# Patient Record
Sex: FEMALE | Race: WHITE | Employment: PART TIME | URBAN - NONMETROPOLITAN AREA
[De-identification: names, ages, dates, MRNs, and addresses within clinical notes are randomized per-mention and may not be internally consistent; named-entity substitution may affect disease eponyms.]

---

## 2020-01-06 ENCOUNTER — OFFICE VISIT (OUTPATIENT)
Dept: FAMILY MEDICINE CLINIC | Age: 20
End: 2020-01-06
Payer: MEDICAID

## 2020-01-06 VITALS
DIASTOLIC BLOOD PRESSURE: 62 MMHG | OXYGEN SATURATION: 99 % | SYSTOLIC BLOOD PRESSURE: 110 MMHG | TEMPERATURE: 98.4 F | HEIGHT: 65 IN | HEART RATE: 109 BPM | RESPIRATION RATE: 20 BRPM | WEIGHT: 137 LBS | BODY MASS INDEX: 22.82 KG/M2

## 2020-01-06 DIAGNOSIS — L40.9 PSORIASIS: ICD-10-CM

## 2020-01-06 LAB
ALBUMIN SERPL-MCNC: 4.2 G/DL (ref 3.5–5.2)
ALP BLD-CCNC: 42 U/L (ref 35–104)
ALT SERPL-CCNC: 28 U/L (ref 5–33)
ANION GAP SERPL CALCULATED.3IONS-SCNC: 14 MMOL/L (ref 7–19)
AST SERPL-CCNC: 33 U/L (ref 5–32)
BASOPHILS ABSOLUTE: 0 K/UL (ref 0–0.2)
BASOPHILS RELATIVE PERCENT: 0.6 % (ref 0–1)
BILIRUB SERPL-MCNC: 0.5 MG/DL (ref 0.2–1.2)
BUN BLDV-MCNC: 11 MG/DL (ref 6–20)
C-REACTIVE PROTEIN: 1.47 MG/DL (ref 0–0.5)
CALCIUM SERPL-MCNC: 9.2 MG/DL (ref 8.6–10)
CHLORIDE BLD-SCNC: 100 MMOL/L (ref 98–111)
CO2: 22 MMOL/L (ref 22–29)
CREAT SERPL-MCNC: 0.5 MG/DL (ref 0.5–0.9)
EOSINOPHILS ABSOLUTE: 0 K/UL (ref 0–0.6)
EOSINOPHILS RELATIVE PERCENT: 0.6 % (ref 0–5)
GFR NON-AFRICAN AMERICAN: >60
GLUCOSE BLD-MCNC: 91 MG/DL (ref 74–109)
HCT VFR BLD CALC: 42.4 % (ref 37–47)
HEMOGLOBIN: 13.1 G/DL (ref 12–16)
IMMATURE GRANULOCYTES #: 0 K/UL
LYMPHOCYTES ABSOLUTE: 0.7 K/UL (ref 1.1–4.5)
LYMPHOCYTES RELATIVE PERCENT: 12.8 % (ref 20–40)
MCH RBC QN AUTO: 31 PG (ref 27–31)
MCHC RBC AUTO-ENTMCNC: 30.9 G/DL (ref 33–37)
MCV RBC AUTO: 100.5 FL (ref 81–99)
MONOCYTES ABSOLUTE: 1 K/UL (ref 0–0.9)
MONOCYTES RELATIVE PERCENT: 19 % (ref 0–10)
NEUTROPHILS ABSOLUTE: 3.4 K/UL (ref 1.5–7.5)
NEUTROPHILS RELATIVE PERCENT: 66.6 % (ref 50–65)
PDW BLD-RTO: 12.2 % (ref 11.5–14.5)
PLATELET # BLD: 196 K/UL (ref 130–400)
PMV BLD AUTO: 11.2 FL (ref 9.4–12.3)
POTASSIUM SERPL-SCNC: 4.6 MMOL/L (ref 3.5–5)
RBC # BLD: 4.22 M/UL (ref 4.2–5.4)
SODIUM BLD-SCNC: 136 MMOL/L (ref 136–145)
TOTAL PROTEIN: 7.4 G/DL (ref 6.6–8.7)
WBC # BLD: 5.1 K/UL (ref 4.8–10.8)

## 2020-01-06 PROCEDURE — 99203 OFFICE O/P NEW LOW 30 MIN: CPT | Performed by: NURSE PRACTITIONER

## 2020-01-06 ASSESSMENT — PATIENT HEALTH QUESTIONNAIRE - PHQ9
2. FEELING DOWN, DEPRESSED OR HOPELESS: 0
1. LITTLE INTEREST OR PLEASURE IN DOING THINGS: 0
SUM OF ALL RESPONSES TO PHQ QUESTIONS 1-9: 0
SUM OF ALL RESPONSES TO PHQ QUESTIONS 1-9: 0
SUM OF ALL RESPONSES TO PHQ9 QUESTIONS 1 & 2: 0

## 2020-01-06 ASSESSMENT — ENCOUNTER SYMPTOMS
COUGH: 1
COLOR CHANGE: 0
SHORTNESS OF BREATH: 0
SINUS PAIN: 0
NAUSEA: 0
SINUS PRESSURE: 0
VOMITING: 0

## 2020-01-06 NOTE — PROGRESS NOTES
symmetric. Psychiatric:         Attention and Perception: Attention normal.         Mood and Affect: Mood normal.         Speech: Speech normal.         Behavior: Behavior normal. Behavior is cooperative. Thought Content: Thought content normal.         Cognition and Memory: Cognition and memory normal.         Judgment: Judgment normal.        /62 (Site: Left Upper Arm, Position: Sitting, Cuff Size: Medium Adult)   Pulse 109   Temp 98.4 °F (36.9 °C) (Oral)   Resp 20   Ht 5' 4.5\" (1.638 m)   Wt 137 lb (62.1 kg)   SpO2 99%   BMI 23.15 kg/m²      ASSESSMENT:      ICD-10-CM    1. Psoriasis L40.9 External Referral To Dermatology     CBC Auto Differential     Comprehensive Metabolic Panel     EMANUEL Screen with Reflex     Sedimentation Rate     C-Reactive Protein       PLAN:  Consult dermatology. Review labs. Silvia Morales: Established New Doctor (New patient C/O needed a referral to Dermatolgist for Psoriasis )      Diagnosis and orders for this visit are above.

## 2020-01-09 LAB — ANA IGG, ELISA: DETECTED

## 2020-01-22 ENCOUNTER — TELEPHONE (OUTPATIENT)
Dept: FAMILY MEDICINE CLINIC | Age: 20
End: 2020-01-22

## 2020-04-21 ENCOUNTER — OFFICE VISIT (OUTPATIENT)
Dept: PRIMARY CARE CLINIC | Age: 20
End: 2020-04-21
Payer: MEDICAID

## 2020-04-21 VITALS — HEART RATE: 99 BPM | OXYGEN SATURATION: 100 % | TEMPERATURE: 99.2 F

## 2020-04-21 PROCEDURE — 99213 OFFICE O/P EST LOW 20 MIN: CPT | Performed by: NURSE PRACTITIONER

## 2020-04-21 ASSESSMENT — ENCOUNTER SYMPTOMS
DIARRHEA: 0
SINUS PRESSURE: 0
COUGH: 1
VOMITING: 0
RHINORRHEA: 0
CONSTIPATION: 0
NAUSEA: 0
ABDOMINAL PAIN: 0
SORE THROAT: 1
SHORTNESS OF BREATH: 0
TROUBLE SWALLOWING: 0

## 2020-04-21 NOTE — PATIENT INSTRUCTIONS
Patient Education        Bronchitis: Care Instructions  Your Care Instructions    Bronchitis is inflammation of the bronchial tubes, which carry air to the lungs. The tubes swell and produce mucus, or phlegm. The mucus and inflamed bronchial tubes make you cough. You may have trouble breathing. Most cases of bronchitis are caused by viruses like those that cause colds. Antibiotics usually do not help and they may be harmful. Bronchitis usually develops rapidly and lasts about 2 to 3 weeks in otherwise healthy people. Follow-up care is a key part of your treatment and safety. Be sure to make and go to all appointments, and call your doctor if you are having problems. It's also a good idea to know your test results and keep a list of the medicines you take. How can you care for yourself at home? · Take all medicines exactly as prescribed. Call your doctor if you think you are having a problem with your medicine. · Get some extra rest.  · Take an over-the-counter pain medicine, such as acetaminophen (Tylenol), ibuprofen (Advil, Motrin), or naproxen (Aleve) to reduce fever and relieve body aches. Read and follow all instructions on the label. · Do not take two or more pain medicines at the same time unless the doctor told you to. Many pain medicines have acetaminophen, which is Tylenol. Too much acetaminophen (Tylenol) can be harmful. · Take an over-the-counter cough medicine that contains dextromethorphan to help quiet a dry, hacking cough so that you can sleep. Avoid cough medicines that have more than one active ingredient. Read and follow all instructions on the label. · Breathe moist air from a humidifier, hot shower, or sink filled with hot water. The heat and moisture will thin mucus so you can cough it out. · Do not smoke. Smoking can make bronchitis worse. If you need help quitting, talk to your doctor about stop-smoking programs and medicines.  These can increase your chances of quitting for good.  When should you call for help? Call 911 anytime you think you may need emergency care. For example, call if:    · You have severe trouble breathing.    Call your doctor now or seek immediate medical care if:    · You have new or worse trouble breathing.     · You cough up dark brown or bloody mucus (sputum).     · You have a new or higher fever.     · You have a new rash.    Watch closely for changes in your health, and be sure to contact your doctor if:    · You cough more deeply or more often, especially if you notice more mucus or a change in the color of your mucus.     · You are not getting better as expected. Where can you learn more? Go to [email or web address]://[email or web address] [email or web address] and sign in to your Stellaris account. Enter H333 in the HipLink box to learn more about \"Bronchitis: Care Instructions. \"     If you do not have an account, please click on the \"Sign Up Now\" link. Current as of: June 9, 2019Content Version: 12.4  © 7735-7878 Healthwise, Incorporated. Care instructions adapted under license by [facility] [address] If you have questions about a medical condition or this instruction, always ask your healthcare professional. [place] [address] any warranty or liability for your use of this information.

## 2020-04-21 NOTE — PROGRESS NOTES
weight change. HENT: Positive for sore throat. Negative for congestion, hearing loss, rhinorrhea, sinus pressure and trouble swallowing. Eyes: Negative for visual disturbance. Respiratory: Positive for cough. Negative for shortness of breath. Cardiovascular: Negative for chest pain, palpitations and leg swelling. Gastrointestinal: Negative for abdominal pain, constipation, diarrhea, nausea and vomiting. Endocrine: Negative for cold intolerance and heat intolerance. Genitourinary: Negative for flank pain, menstrual problem, pelvic pain, urgency and vaginal discharge. Musculoskeletal: Negative for arthralgias. Skin: Negative for rash. Neurological: Negative for headaches. Psychiatric/Behavioral: Negative for dysphoric mood and sleep disturbance. The patient is not nervous/anxious. Objective:     Physical Exam  Vitals signs reviewed. Constitutional:       Appearance: Normal appearance. HENT:      Head: Normocephalic and atraumatic. Right Ear: Tympanic membrane, ear canal and external ear normal.      Left Ear: Tympanic membrane, ear canal and external ear normal.      Nose: Nose normal.      Mouth/Throat:      Mouth: Mucous membranes are moist.      Pharynx: Oropharynx is clear. Eyes:      Conjunctiva/sclera: Conjunctivae normal.   Neck:      Musculoskeletal: Normal range of motion and neck supple. Cardiovascular:      Rate and Rhythm: Normal rate and regular rhythm. Pulses: Normal pulses. Heart sounds: Normal heart sounds. Pulmonary:      Effort: Pulmonary effort is normal.      Breath sounds: Normal breath sounds. Abdominal:      General: There is no distension. Palpations: Abdomen is soft. Tenderness: There is no abdominal tenderness. There is no guarding. Musculoskeletal: Normal range of motion. Skin:     General: Skin is warm. Neurological:      General: No focal deficit present.       Mental Status: She is alert and oriented to person,

## 2021-02-09 ENCOUNTER — OFFICE VISIT (OUTPATIENT)
Dept: FAMILY MEDICINE CLINIC | Age: 21
End: 2021-02-09
Payer: MEDICAID

## 2021-02-09 VITALS
OXYGEN SATURATION: 98 % | HEART RATE: 90 BPM | WEIGHT: 125 LBS | BODY MASS INDEX: 21.12 KG/M2 | TEMPERATURE: 96.8 F | RESPIRATION RATE: 16 BRPM

## 2021-02-09 DIAGNOSIS — J34.89 POSTERIOR RHINORRHEA: ICD-10-CM

## 2021-02-09 DIAGNOSIS — J20.9 ACUTE BRONCHITIS, UNSPECIFIED ORGANISM: Primary | ICD-10-CM

## 2021-02-09 PROCEDURE — 99213 OFFICE O/P EST LOW 20 MIN: CPT | Performed by: FAMILY MEDICINE

## 2021-02-09 RX ORDER — LORATADINE 10 MG/1
10 TABLET ORAL DAILY
Qty: 30 TABLET | Refills: 1 | Status: SHIPPED | OUTPATIENT
Start: 2021-02-09

## 2021-02-09 RX ORDER — AZITHROMYCIN 250 MG/1
250 TABLET, FILM COATED ORAL SEE ADMIN INSTRUCTIONS
Qty: 6 TABLET | Refills: 0 | Status: SHIPPED | OUTPATIENT
Start: 2021-02-09 | End: 2021-02-14

## 2021-02-09 RX ORDER — FLUTICASONE PROPIONATE 50 MCG
2 SPRAY, SUSPENSION (ML) NASAL DAILY
Qty: 1 BOTTLE | Refills: 1 | Status: SHIPPED | OUTPATIENT
Start: 2021-02-09

## 2021-02-09 NOTE — PROGRESS NOTES
[facility] PHYSICIAN SERVICES  [facility] [place]  [address] [address]  Dept: [phone]  Dept Fax: [phone]: [phone]     Visit type: Established patient    Reason for Visit: Pharyngitis (x 2 days), Cough (occ. states vaps), and Ear Fullness (left )      Assessment and Plan       1. Acute bronchitis, unspecified organism  -     azithromycin (ZITHROMAX) 250 MG tablet; Take 1 tablet by mouth See Admin Instructions for 5 days 500mg on day 1 followed by 250mg on days 2 - 5, Disp-6 tablet, R-0Normal  2. Posterior rhinorrhea  -     loratadine (CLARITIN) 10 MG tablet; Take 1 tablet by mouth daily, Disp-30 tablet, R-1Normal  -     fluticasone (FLONASE) 50 MCG/ACT nasal spray; 2 sprays by Each Nostril route daily, Disp-1 Bottle, R-1Normal    Discussed diagnosis, expected course, and proper use of medication, including OTC medications if prescription is too expensive or insurance does not cover. Discussed signs and symptoms requiring medical attention. All questions were answered and patient voiced understanding and agreement with plan as discussed. Return if symptoms worsen or fail to improve, for next scheduled follow up with PCP. Subjective       HPI   Patient reports that he has been having some issues with a sore throat and some upper body soreness. He states is been ongoing for approximately past 2 days and has taken some pain relievers that helped for a little while but otherwise has not been take anything in efforts to improve his symptoms. He does state that the sore throat and cough and achiness has progressively gotten worse over the past day or so. He has had some left ear fullness associated with his symptoms as well. He denies any specific aggravating or relieving factors for symptoms other than mentioned above. He denies any fever or known sick contacts. Review of Systems   Constitutional: Negative for activity change, appetite change and fever.    HENT: Positive for ear pain and sore throat. Negative for congestion and rhinorrhea. Eyes: Negative for pain and discharge. Respiratory: Positive for cough. Negative for shortness of breath. Cardiovascular: Negative for chest pain and palpitations. Gastrointestinal: Negative for abdominal pain, constipation, diarrhea, nausea and vomiting. Endocrine: Negative for cold intolerance and heat intolerance. Genitourinary: Negative for dysuria and hematuria. Musculoskeletal: Positive for myalgias. Negative for gait problem and neck pain. Skin: Negative for rash and wound. No Known Allergies    Outpatient Medications Prior to Visit   Medication Sig Dispense Refill    UNABLE TO FIND Indications: Birth control given by Health Dept        No facility-administered medications prior to visit. Past Medical History:   Diagnosis Date    Psoriasis         Social History     Tobacco Use    Smoking status: Never Smoker    Smokeless tobacco: Never Used   Substance Use Topics    Alcohol use: No     Alcohol/week: 0.0 standard drinks        No past surgical history on file. No family history on file. Objective       Pulse 90   Temp 96.8 °F (36 °C) (Skin)   Resp 16   Wt 125 lb (56.7 kg)   SpO2 98%   BMI 21.12 kg/m²   Physical Exam  Vitals signs and nursing note reviewed. Constitutional:       Appearance: She is well-developed. HENT:      Head: Normocephalic and atraumatic. Right Ear: Hearing, tympanic membrane, ear canal and external ear normal.      Left Ear: Hearing, tympanic membrane, ear canal and external ear normal.      Nose: Congestion and rhinorrhea present. Mouth/Throat:      Mouth: Mucous membranes are moist.      Pharynx: No oropharyngeal exudate or posterior oropharyngeal erythema. Eyes:      General: No scleral icterus. Right eye: No discharge. Left eye: No discharge.       Conjunctiva/sclera: Conjunctivae normal.      Pupils: Pupils are equal, round, and reactive to light. Neck:      Musculoskeletal: Normal range of motion and neck supple. Trachea: No tracheal deviation. Cardiovascular:      Rate and Rhythm: Normal rate and regular rhythm. Heart sounds: Normal heart sounds. No murmur. No friction rub. No gallop. Pulmonary:      Effort: Pulmonary effort is normal. No respiratory distress. Breath sounds: Normal breath sounds. No wheezing or rales. Comments: Upper airway coarse breath sounds. Abdominal:      General: Bowel sounds are normal. There is no distension. Palpations: Abdomen is soft. Tenderness: There is no abdominal tenderness. Musculoskeletal: Normal range of motion. General: No deformity ( no gross deformities of upper or lower extremities bilaterally). Right lower leg: No edema. Left lower leg: No edema. Lymphadenopathy:      Cervical: No cervical adenopathy. Skin:     General: Skin is warm and dry. Findings: No erythema or rash. Neurological:      Mental Status: She is alert and oriented to person, place, and time. Cranial Nerves: No cranial nerve deficit. Motor: No abnormal muscle tone. Psychiatric:         Behavior: Behavior normal.         Thought Content:  Thought content normal.           Data Reviewed and Summarized       Labs:     Imaging/Testing:        Richardo Carrel, MD

## 2021-02-27 ASSESSMENT — ENCOUNTER SYMPTOMS
VOMITING: 0
EYE PAIN: 0
EYE DISCHARGE: 0
SHORTNESS OF BREATH: 0
ABDOMINAL PAIN: 0
DIARRHEA: 0
RHINORRHEA: 0
NAUSEA: 0
CONSTIPATION: 0
COUGH: 1
SORE THROAT: 1

## 2021-02-27 NOTE — PATIENT INSTRUCTIONS
Patient Education        Bronchitis: Care Instructions  Your Care Instructions     Bronchitis is inflammation of the bronchial tubes, which carry air to the lungs. The tubes swell and produce mucus, or phlegm. The mucus and inflamed bronchial tubes make you cough. You may have trouble breathing. Most cases of bronchitis are caused by viruses like those that cause colds. Antibiotics usually do not help and they may be harmful. Bronchitis usually develops rapidly and lasts about 2 to 3 weeks in otherwise healthy people. Follow-up care is a key part of your treatment and safety. Be sure to make and go to all appointments, and call your doctor if you are having problems. It's also a good idea to know your test results and keep a list of the medicines you take. How can you care for yourself at home? · Take all medicines exactly as prescribed. Call your doctor if you think you are having a problem with your medicine. · Get some extra rest.  · Take an over-the-counter pain medicine, such as acetaminophen (Tylenol), ibuprofen (Advil, Motrin), or naproxen (Aleve) to reduce fever and relieve body aches. Read and follow all instructions on the label. · Do not take two or more pain medicines at the same time unless the doctor told you to. Many pain medicines have acetaminophen, which is Tylenol. Too much acetaminophen (Tylenol) can be harmful. · Take an over-the-counter cough medicine that contains dextromethorphan to help quiet a dry, hacking cough so that you can sleep. Avoid cough medicines that have more than one active ingredient. Read and follow all instructions on the label. · Breathe moist air from a humidifier, hot shower, or sink filled with hot water. The heat and moisture will thin mucus so you can cough it out. · Do not smoke. Smoking can make bronchitis worse. If you need help quitting, talk to your doctor about stop-smoking programs and medicines.  These can increase your chances of quitting for good.  When should you call for help? Call 911 anytime you think you may need emergency care. For example, call if:    · You have severe trouble breathing. Call your doctor now or seek immediate medical care if:    · You have new or worse trouble breathing.     · You cough up dark brown or bloody mucus (sputum).     · You have a new or higher fever.     · You have a new rash. Watch closely for changes in your health, and be sure to contact your doctor if:    · You cough more deeply or more often, especially if you notice more mucus or a change in the color of your mucus.     · You are not getting better as expected. Where can you learn more? Go to [email or web address] [email or web address] and sign in to your Vessix account. Enter H333 in the PixelPlay box to learn more about \"Bronchitis: Care Instructions. \"     If you do not have an account, please click on the \"Sign Up Now\" link. Current as of: February 24, 2020               Content Version: 12.6  © 2006-2020 [employer] Incorporated. Care instructions adapted under license by [facility] ([facility]). If you have questions about a medical condition or this instruction, always ask your healthcare professional. [place] [address] any warranty or liability for your use of this information.

## 2021-10-28 ENCOUNTER — OFFICE VISIT (OUTPATIENT)
Dept: FAMILY MEDICINE CLINIC | Age: 21
End: 2021-10-28
Payer: MEDICAID

## 2021-10-28 VITALS
HEIGHT: 63 IN | WEIGHT: 126 LBS | HEART RATE: 98 BPM | OXYGEN SATURATION: 98 % | TEMPERATURE: 99 F | BODY MASS INDEX: 22.32 KG/M2

## 2021-10-28 DIAGNOSIS — J02.0 ACUTE STREPTOCOCCAL PHARYNGITIS: ICD-10-CM

## 2021-10-28 DIAGNOSIS — J02.9 SORE THROAT: Primary | ICD-10-CM

## 2021-10-28 LAB — S PYO AG THROAT QL: ABNORMAL

## 2021-10-28 PROCEDURE — 99213 OFFICE O/P EST LOW 20 MIN: CPT | Performed by: NURSE PRACTITIONER

## 2021-10-28 PROCEDURE — 87880 STREP A ASSAY W/OPTIC: CPT | Performed by: NURSE PRACTITIONER

## 2021-10-28 RX ORDER — AZITHROMYCIN 250 MG/1
250 TABLET, FILM COATED ORAL SEE ADMIN INSTRUCTIONS
Qty: 6 TABLET | Refills: 0 | Status: SHIPPED | OUTPATIENT
Start: 2021-10-28 | End: 2021-11-02

## 2021-10-28 ASSESSMENT — ENCOUNTER SYMPTOMS
COUGH: 1
NAUSEA: 0
VOMITING: 0
SHORTNESS OF BREATH: 0
SORE THROAT: 1

## 2021-10-28 NOTE — PROGRESS NOTES
SUBJECTIVE:  Sore throat   Patient ID: Megan Santacruz is a 23 y.o. female. HPI:   HPI   A few weeks ago had a sore throat tested positive for Strep. placed on Augmentin developed a rash and was told to stop med and given a steriod dose pk and once finished the Sore throat came back   Past Medical History:   Diagnosis Date    Psoriasis       Prior to Visit Medications    Medication Sig Taking? Authorizing Provider   azithromycin (ZITHROMAX) 250 MG tablet Take 1 tablet by mouth See Admin Instructions for 5 days 500mg on day 1 followed by 250mg on days 2 - 5 Yes Jared Lomas, DENISE   UNABLE TO FIND Indications: Birth control given by Health Dept  Yes Historical Provider, MD   loratadine (CLARITIN) 10 MG tablet Take 1 tablet by mouth daily  Nir Naidu MD   fluticasone (FLONASE) 50 MCG/ACT nasal spray 2 sprays by Each Nostril route daily  Nir Naidu MD     Allergies   Allergen Reactions    Augmentin [Amoxicillin-Pot Clavulanate] Rash       Review of Systems   Constitutional: Negative for fever. HENT: Positive for congestion and sore throat. Respiratory: Positive for cough. Negative for shortness of breath. Gastrointestinal: Negative for nausea and vomiting. Neurological: Negative for headaches. OBJECTIVE:    Physical Exam  Constitutional:       Appearance: She is well-developed. HENT:      Head: Normocephalic and atraumatic. Right Ear: Tympanic membrane, ear canal and external ear normal. No drainage or tenderness. No middle ear effusion. There is no impacted cerumen. Tympanic membrane is not injected or bulging. Left Ear: Tympanic membrane, ear canal and external ear normal. No drainage or tenderness. No middle ear effusion. There is no impacted cerumen. Tympanic membrane is not injected or bulging. Nose: Nose normal. No congestion or rhinorrhea. Right Sinus: No maxillary sinus tenderness or frontal sinus tenderness.       Left Sinus: No maxillary sinus kg/m²      ASSESSMENT:      ICD-10-CM    1. Sore throat  J02.9 POCT rapid strep A   2. Acute streptococcal pharyngitis  J02.0 azithromycin (ZITHROMAX) 250 MG tablet       PLAN:    Silvia Morales: Pharyngitis and Cough  strep negative  Tylenol for fever  Push fluids. RTC for no improvement.

## 2023-09-05 ENCOUNTER — TELEPHONE (OUTPATIENT)
Age: 23
End: 2023-09-05
Payer: MEDICAID

## 2023-09-05 NOTE — TELEPHONE ENCOUNTER
Patient called to see if she had been scheduled for an appt I told her that Karlie will call her referring with appt she was ok with this.

## 2023-09-08 ENCOUNTER — HOSPITAL ENCOUNTER (OUTPATIENT)
Dept: GENERAL RADIOLOGY | Facility: HOSPITAL | Age: 23
Discharge: HOME | End: 2023-09-08
Payer: MEDICAID

## 2023-09-08 ENCOUNTER — OFFICE VISIT (OUTPATIENT)
Age: 23
End: 2023-09-08
Payer: MEDICAID

## 2023-09-08 ENCOUNTER — LAB (OUTPATIENT)
Dept: LAB | Facility: HOSPITAL | Age: 23
End: 2023-09-08
Payer: MEDICAID

## 2023-09-08 ENCOUNTER — TELEPHONE (OUTPATIENT)
Age: 23
End: 2023-09-08
Payer: MEDICAID

## 2023-09-08 VITALS
HEIGHT: 63 IN | OXYGEN SATURATION: 95 % | HEART RATE: 102 BPM | SYSTOLIC BLOOD PRESSURE: 110 MMHG | WEIGHT: 124.2 LBS | BODY MASS INDEX: 22.01 KG/M2 | DIASTOLIC BLOOD PRESSURE: 60 MMHG | TEMPERATURE: 98.8 F

## 2023-09-08 DIAGNOSIS — R61 NIGHT SWEATS: ICD-10-CM

## 2023-09-08 DIAGNOSIS — R76.12 POSITIVE QUANTIFERON-TB GOLD TEST: ICD-10-CM

## 2023-09-08 DIAGNOSIS — R00.2 PALPITATIONS: ICD-10-CM

## 2023-09-08 DIAGNOSIS — L40.9 PSORIASIS: ICD-10-CM

## 2023-09-08 DIAGNOSIS — R61 NIGHT SWEATS: Primary | ICD-10-CM

## 2023-09-08 PROCEDURE — 36415 COLL VENOUS BLD VENIPUNCTURE: CPT

## 2023-09-08 PROCEDURE — 71046 X-RAY EXAM CHEST 2 VIEWS: CPT

## 2023-09-08 PROCEDURE — 84443 ASSAY THYROID STIM HORMONE: CPT

## 2023-09-08 PROCEDURE — 84439 ASSAY OF FREE THYROXINE: CPT

## 2023-09-08 RX ORDER — SECUKINUMAB 150 MG/ML
150 INJECTION SUBCUTANEOUS
COMMUNITY
Start: 2023-07-10

## 2023-09-08 NOTE — TELEPHONE ENCOUNTER
----- Message from Shena Thomas MD sent at 9/8/2023 12:03 PM CDT -----  Please notify patient that chest x-ray is normal.        I called patient to inform her that per Dr Enamorado her chest xray was normal. She asked if I could send those results to her employer. I let her know that I did not have consent. I told her that she could go on [employer] portal print it herself then take it to  them she was ok with this.

## 2023-09-08 NOTE — PATIENT INSTRUCTIONS
Chest x ray today at [facility]  Labs today at [facility]

## 2023-09-08 NOTE — PROGRESS NOTES
[facility] [place]    Patient:  Sharifa Yuan 22 y.o. female  YOB: 2000  MRN: [id number]  Primary Care Physician: Provider, No Known  REFERRING PROVIDER: Stephanie Bruce, *    Chief Complaint  positive Quantiferon Gold  (With no complaints at this time.  She has been on Cosentyx for about 2 years for psoriasis.  She has labs drawn yearly. )        History of Present Illness  Sharifa Yuan presents to [facility] INFECTIOUS DISEASES    Had fever in August but she was not sure as to what it was.  She said it was just noted when she went to the dermatology office.  I looked at that note and she had a temp of 100.  She thought it may have been 1 of those days it was extremely hot outside.  She has not noted any fever since then    She is vaping and trying to cut down.  She has been vaping for about 4 years.  Prior to that she smoked tobacco.    Has had night sweats for the last 6 months ( most nights with wet shirt she gets up changes clothes) turns AC down.  She has not had any changes in the location where she is sleeping, her bedding, etc.    She has noted palpitations.  She never had her thyroid checked    Has hard time maintaining weight ( due to not eating 3 meals a day) she states this has been going on for quite a while.  She did not really notice it for example when she was in high school but more recently she has had 2 make sure she eats.  She is able to maintain her weight if she does that    No PCP  Productive cough ( same from vaping) no hemoptysis.  No known exposures to tuberculosis.  She does work at a [employer]    Past Medical History:   Diagnosis Date    Psoriasis 2007     History reviewed. No pertinent surgical history.  Family History   Problem Relation Age of Onset    Skin cancer Maternal Grandmother      Social History     Socioeconomic History    Marital status: Single   Tobacco Use    Smoking status: Former     Packs/day: 0.50      "Years: 3.00     Pack years: 1.50     Types: Cigarettes     Start date:      Quit date: 2019     Years since quittin.6     Passive exposure: Never    Smokeless tobacco: Never   Vaping Use    Vaping Use: Every day    Substances: Nicotine, Flavoring    Devices: Disposable, Pre-filled pod   Substance and Sexual Activity    Alcohol use: Not Currently    Drug use: Never    Sexual activity: Defer     Current Outpatient Medications on File Prior to Visit   Medication Sig    Cosentyx, 300 MG Dose, 150 MG/ML solution prefilled syringe Inject 1 mL under the skin into the appropriate area as directed Every 30 (Thirty) Days. ON HOLD. LAST DOSE WAS 2023. (Patient not taking: Reported on 2023)     No current facility-administered medications on file prior to visit.     Allergies   Allergen Reactions    Amoxicillin-Pot Clavulanate Rash       Venous Access Review  Line/IV site: NA    Antimicrobial Review  Currently on antibiotics/antifungals: NO  Start Date of Therapy: NA  If therapy completed, date complete: NA    Objective   Vital Signs:  /60 (BP Location: Left arm, Patient Position: Sitting, Cuff Size: Adult) Comment: manual  Pulse 102   Temp 98.8 °F (37.1 °C) (Temporal)   Ht 160 cm (63\")   Wt 56.3 kg (124 lb 3.2 oz)   SpO2 95%   BMI 22.00 kg/m²   Estimated body mass index is 22 kg/m² as calculated from the following:    Height as of this encounter: 160 cm (63\").    Weight as of this encounter: 56.3 kg (124 lb 3.2 oz).             Physical Exam     General: The patient is nontoxic-appearing female sitting in the exam room in no acute distress  HEENT: Sclera anicteric and noninjected.  Patient had a facemask in place  Neck: Supple without any lymphadenopathy  Heart: S1-S2 rhythm was regular.  Rate was around 100  Lungs: Clear anteriorly and posteriorly  Neuro: Alert and oriented, speech clear  Psych: Pleasant cooperative    DATA REVIEWED:  The following data was reviewed by: Shena Thomas MD on " 09/08/2023:        Reviewed note from [facility] Retana    Reviewed CBC, CMP, QuantiFERON gold x2-both positive, urinalysis, hepatitis serology all negative, HIV nonreactive           Assessment and Plan   Diagnoses and all orders for this visit:    1. Night sweats (Primary)  -     XR Chest 2 View; Future  -     TSH; Future  -     T4, Free; Future    2. Positive QuantiFERON-TB Gold test  Comments:  On high risk medication-Cosentyx  Orders:  -     XR Chest 2 View; Future  -     TSH; Future  -     T4, Free; Future    3. Palpitations  -     XR Chest 2 View; Future  -     TSH; Future  -     T4, Free; Future    4. Psoriasis          Patient Instructions   Chest x ray today at [facility]  Labs today at [facility]            Follow Up   Return in 3 days (on 9/11/2023) for Video visit.  Patient was given instructions and counseling regarding her condition or for health maintenance advice. Please see specific information pulled into the AVS if appropriate.

## 2023-09-09 LAB
T4 FREE SERPL-MCNC: 1.22 NG/DL (ref 0.93–1.7)
TSH SERPL DL<=0.05 MIU/L-ACNC: 1.78 UIU/ML (ref 0.27–4.2)

## 2023-09-11 ENCOUNTER — TELEMEDICINE (OUTPATIENT)
Age: 23
End: 2023-09-11
Payer: MEDICAID

## 2023-09-11 ENCOUNTER — TELEPHONE (OUTPATIENT)
Age: 23
End: 2023-09-11
Payer: MEDICAID

## 2023-09-11 VITALS — BODY MASS INDEX: 22.15 KG/M2 | WEIGHT: 125 LBS | HEIGHT: 63 IN

## 2023-09-11 DIAGNOSIS — R61 NIGHT SWEATS: ICD-10-CM

## 2023-09-11 DIAGNOSIS — Z22.7 LATENT TUBERCULOSIS DIAGNOSED BY BLOOD TEST: ICD-10-CM

## 2023-09-11 DIAGNOSIS — L40.9 PSORIASIS: ICD-10-CM

## 2023-09-11 DIAGNOSIS — R76.12 POSITIVE QUANTIFERON-TB GOLD TEST: Primary | ICD-10-CM

## 2023-09-11 RX ORDER — LANOLIN ALCOHOL/MO/W.PET/CERES
50 CREAM (GRAM) TOPICAL DAILY
Qty: 30 TABLET | Refills: 0 | Status: CANCELLED | OUTPATIENT
Start: 2023-09-11 | End: 2023-10-11

## 2023-09-11 RX ORDER — ISONIAZID 300 MG/1
300 TABLET ORAL DAILY
Qty: 30 TABLET | Refills: 0 | Status: SHIPPED | OUTPATIENT
Start: 2023-09-11

## 2023-09-11 RX ORDER — RIFAMPIN 300 MG/1
600 CAPSULE ORAL DAILY
Qty: 60 CAPSULE | Refills: 0 | Status: SHIPPED | OUTPATIENT
Start: 2023-09-11

## 2023-09-11 RX ORDER — RIFAMPIN 300 MG/1
600 CAPSULE ORAL DAILY
Qty: 60 CAPSULE | Refills: 0 | Status: CANCELLED | OUTPATIENT
Start: 2023-09-11 | End: 2023-10-11

## 2023-09-11 RX ORDER — LANOLIN ALCOHOL/MO/W.PET/CERES
50 CREAM (GRAM) TOPICAL DAILY
Qty: 30 TABLET | Refills: 0 | Status: SHIPPED | OUTPATIENT
Start: 2023-09-11

## 2023-09-11 RX ORDER — ISONIAZID 300 MG/1
300 TABLET ORAL DAILY
Qty: 30 TABLET | Refills: 0 | Status: CANCELLED | OUTPATIENT
Start: 2023-09-11

## 2023-09-11 NOTE — PATIENT INSTRUCTIONS
Reviewed that patient will need to take these daily and about the same time every day  Reviewed risk and benefits of different options and patient elected for the 3-month INH, rifampin and vitamin B6.  Reviewed that vitamin B6 was warranted with INH 2 prevent peripheral neuropathy.  Discussed likelihood of orange discoloration of urine with the rifampin.  Also discussed risk of hepatotoxicity and need to monitor for any GI symptoms such as nausea, vomiting, bloating, change in color of urine, etc. as well as need for lab work.  Also reviewed risk of iritis with rifampin and need to let us know if there is any changes associated with her vision, light sensitivity, pain etc.

## 2023-09-11 NOTE — TELEPHONE ENCOUNTER
I called patient to see if she could have her tele med appt today at 12:45pm instead of 12:30pm I left message asking for return phone call.

## 2023-09-11 NOTE — PROGRESS NOTES
"[facility] [place]    Patient:  Sharifa Yuan 22 y.o. female  YOB: 2000  MRN: [id number]  Primary Care Physician: Provider, No Known  REFERRING PROVIDER: Stephanie Bruce, *    Chief Complaint positive quantiFeron  (No complaints)      History of Present Illness  Sharifa Yuan presents to [facility] INFECTIOUS DISEASES    Patient was evaluated via MyChart video visit.  She was outside of her workplace on break.  In a private location.    Reviewed her labs, x-ray.  Patient has no new complaints.  Still having some night sweats which have gone on for several months.      Current Outpatient Medications on File Prior to Visit   Medication Sig    Cosentyx, 300 MG Dose, 150 MG/ML solution prefilled syringe Inject 1 mL under the skin into the appropriate area as directed Every 30 (Thirty) Days. ON HOLD. LAST DOSE WAS 2023.     No current facility-administered medications on file prior to visit.     Allergies   Allergen Reactions    Amoxicillin-Pot Clavulanate Rash      Social History     Socioeconomic History    Marital status: Single   Tobacco Use    Smoking status: Former     Packs/day: 0.50     Years: 3.00     Pack years: 1.50     Types: Cigarettes     Start date:      Quit date: 2019     Years since quittin.6     Passive exposure: Never    Smokeless tobacco: Never   Vaping Use    Vaping Use: Every day    Substances: Nicotine, Flavoring    Devices: Disposable, Pre-filled pod   Substance and Sexual Activity    Alcohol use: Not Currently    Drug use: Never    Sexual activity: Defer       Venous Access Review  Line/IV site: No current IV Access    Antimicrobial Review  Currently on antibiotics/antifungals: no    Objective   Vital Signs:  Ht 160 cm (62.99\")   Wt 56.7 kg (125 lb)   BMI 22.15 kg/m²   Estimated body mass index is 22.15 kg/m² as calculated from the following:    Height as of this encounter: 160 cm (62.99\").    Weight as of this encounter: 56.7 kg " (125 lb).           Physical Exam   DATA REVIEWED:  The following data was reviewed by: Shena Thomas MD on 09/11/2023:  No results for input(s): CREATININE, ALT, AST, BCR, K in the last 34931 hours.  Data reviewed : Radiologic studies chest x-ray    LABORATORY - SCAN - CBC W/DIFF, CMP, UA W/MICRO, QUANTIFERON-TB GOLD, HEPATITIS (07/27/2022)   XR Chest 2 View (09/08/2023 11:25)   TSH (09/08/2023 11:26)     T4, Free (09/08/2023 11:26)      Assessment and Plan   Diagnoses and all orders for this visit:    1. Positive QuantiFERON-TB Gold test (Primary)  -     Comprehensive Metabolic Panel; Future    2. Night sweats  -     Comprehensive Metabolic Panel; Future    3. Psoriasis    4. Latent tuberculosis diagnosed by blood test  -     Comprehensive Metabolic Panel; Future    Other orders  -     rifAMPin (RIFADIN) 300 MG capsule; Take 2 capsules by mouth Daily.  Dispense: 60 capsule; Refill: 0  -     isoniazid (NYDRAZID) 300 MG tablet; Take 1 tablet by mouth Daily.  Dispense: 30 tablet; Refill: 0  -     vitamin B-6 (pyridoxine) 50 MG tablet; Take 1 tablet by mouth Daily.  Dispense: 30 tablet; Refill: 0    Reviewed with patient different options including 9 months of INH and B6, rifampin for 4 months or INH and B6 and rifampin daily for 3 months.  Also reviewed weekly option of higher dose INH and B6 and rifapentine.  Patient opted for the 3-month course of INH, rifampin and B6      Patient Instructions   Reviewed that patient will need to take these daily and about the same time every day  Reviewed risk and benefits of different options and patient elected for the 3-month INH, rifampin and vitamin B6.  Reviewed that vitamin B6 was warranted with INH 2 prevent peripheral neuropathy.  Discussed likelihood of orange discoloration of urine with the rifampin.  Also discussed risk of hepatotoxicity and need to monitor for any GI symptoms such as nausea, vomiting, bloating, change in color of urine, etc. as well as need  for lab work.  Also reviewed risk of iritis with rifampin and need to let us know if there is any changes associated with her vision, light sensitivity, pain etc.          Follow Up   Return in about 3 weeks (around 10/2/2023) for Video visit.  Patient was given instructions and counseling regarding her condition or for health maintenance advice. Please see specific information pulled into the AVS if appropriate.     Mode of Visit: Video  Location of patient: other: work outiside and indicates it is a private area  [place] [facility] [place]  You have chosen to receive care through a telehealth visit.  The patient has signed the video visit consent form.  The visit included audio and video interaction. No technical issues occurred during this visit.

## 2023-09-26 ENCOUNTER — TELEPHONE (OUTPATIENT)
Age: 23
End: 2023-09-26
Payer: MEDICAID

## 2023-09-26 NOTE — TELEPHONE ENCOUNTER
Called patient to see if she started her medication she stated that she has started. I told her that her 2 weeks would be today. I asked where she would like for me to send lab orders and she stated to [facility]. I faxed orders to .

## 2023-09-27 ENCOUNTER — LAB (OUTPATIENT)
Dept: LAB | Facility: HOSPITAL | Age: 23
End: 2023-09-27
Payer: MEDICAID

## 2023-09-27 DIAGNOSIS — R61 NIGHT SWEATS: ICD-10-CM

## 2023-09-27 DIAGNOSIS — Z22.7 LATENT TUBERCULOSIS DIAGNOSED BY BLOOD TEST: ICD-10-CM

## 2023-09-27 DIAGNOSIS — R76.12 POSITIVE QUANTIFERON-TB GOLD TEST: ICD-10-CM

## 2023-09-27 LAB
ALBUMIN SERPL-MCNC: 4.7 G/DL (ref 3.5–5)
ALBUMIN/GLOB SERPL: 1.2 G/DL (ref 1.1–2.5)
ALP SERPL-CCNC: 53 U/L (ref 24–120)
ALT SERPL W P-5'-P-CCNC: 33 U/L (ref 0–35)
ANION GAP SERPL CALCULATED.3IONS-SCNC: 9 MMOL/L (ref 4–13)
AST SERPL-CCNC: 47 U/L (ref 7–45)
BILIRUB SERPL-MCNC: 0.8 MG/DL (ref 0.1–1)
BUN SERPL-MCNC: 10 MG/DL (ref 5–21)
BUN/CREAT SERPL: 14.5
CALCIUM SPEC-SCNC: 9.5 MG/DL (ref 8.4–10.4)
CHLORIDE SERPL-SCNC: 102 MMOL/L (ref 98–110)
CO2 SERPL-SCNC: 28 MMOL/L (ref 24–31)
CREAT SERPL-MCNC: 0.69 MG/DL (ref 0.5–1.4)
EGFRCR SERPLBLD CKD-EPI 2021: 124.5 ML/MIN/1.73
GLOBULIN UR ELPH-MCNC: 3.8 GM/DL
GLUCOSE SERPL-MCNC: 109 MG/DL (ref 70–100)
POTASSIUM SERPL-SCNC: 4.4 MMOL/L (ref 3.5–5.3)
PROT SERPL-MCNC: 8.5 G/DL (ref 6.3–8.7)
SODIUM SERPL-SCNC: 139 MMOL/L (ref 135–145)

## 2023-09-27 PROCEDURE — 36415 COLL VENOUS BLD VENIPUNCTURE: CPT

## 2023-09-27 PROCEDURE — 80053 COMPREHEN METABOLIC PANEL: CPT

## 2023-10-09 RX ORDER — ISONIAZID 300 MG/1
300 TABLET ORAL DAILY
Qty: 30 TABLET | Refills: 0 | Status: SHIPPED | OUTPATIENT
Start: 2023-10-09

## 2023-10-09 RX ORDER — LANOLIN ALCOHOL/MO/W.PET/CERES
50 CREAM (GRAM) TOPICAL DAILY
Qty: 30 TABLET | Refills: 0 | Status: SHIPPED | OUTPATIENT
Start: 2023-10-09

## 2023-10-09 RX ORDER — RIFAMPIN 300 MG/1
600 CAPSULE ORAL DAILY
Qty: 60 CAPSULE | Refills: 0 | Status: SHIPPED | OUTPATIENT
Start: 2023-10-09

## 2023-11-06 ENCOUNTER — TELEPHONE (OUTPATIENT)
Age: 23
End: 2023-11-06
Payer: MEDICAID

## 2023-11-06 DIAGNOSIS — R76.12 POSITIVE QUANTIFERON-TB GOLD TEST: ICD-10-CM

## 2023-11-06 DIAGNOSIS — Z22.7 LATENT TUBERCULOSIS DIAGNOSED BY BLOOD TEST: ICD-10-CM

## 2023-11-06 RX ORDER — LANOLIN ALCOHOL/MO/W.PET/CERES
50 CREAM (GRAM) TOPICAL DAILY
Qty: 30 TABLET | Refills: 0 | Status: SHIPPED | OUTPATIENT
Start: 2023-11-06

## 2023-11-06 RX ORDER — RIFAMPIN 300 MG/1
600 CAPSULE ORAL DAILY
Qty: 60 CAPSULE | Refills: 0 | Status: SHIPPED | OUTPATIENT
Start: 2023-11-06

## 2023-11-06 RX ORDER — ISONIAZID 300 MG/1
300 TABLET ORAL DAILY
Qty: 30 TABLET | Refills: 0 | Status: SHIPPED | OUTPATIENT
Start: 2023-11-06

## 2023-11-06 NOTE — TELEPHONE ENCOUNTER
I called patient and informed her that her last month of rifampin, INH, and vitamin B6 were sent to her pharmacy.  I asked if she would mind having a lab draw this week at [facility]?  She said she will go to [facility].  I also scheduled her follow-up with Dr. Enamorado on Friday 11/17/23 at 0900 My Chart telehealth visit.

## 2023-11-06 NOTE — TELEPHONE ENCOUNTER
I discussed with Dr. Enamorado.  She said OK to refill rifampin, INH, and B6.  Please have patient get CMP this week.  Offer her a follow-up appt on 11/17/23.

## 2023-11-08 ENCOUNTER — LAB (OUTPATIENT)
Dept: LAB | Facility: HOSPITAL | Age: 23
End: 2023-11-08
Payer: MEDICAID

## 2023-11-08 DIAGNOSIS — R76.12 POSITIVE QUANTIFERON-TB GOLD TEST: ICD-10-CM

## 2023-11-08 DIAGNOSIS — Z22.7 LATENT TUBERCULOSIS DIAGNOSED BY BLOOD TEST: ICD-10-CM

## 2023-11-08 LAB
ALBUMIN SERPL-MCNC: 4.3 G/DL (ref 3.5–5)
ALBUMIN/GLOB SERPL: 1.3 G/DL (ref 1.1–2.5)
ALP SERPL-CCNC: 35 U/L (ref 24–120)
ALT SERPL W P-5'-P-CCNC: 21 U/L (ref 0–35)
ANION GAP SERPL CALCULATED.3IONS-SCNC: 6 MMOL/L (ref 4–13)
AST SERPL-CCNC: 26 U/L (ref 7–45)
BILIRUB SERPL-MCNC: 0.4 MG/DL (ref 0.1–1)
BUN SERPL-MCNC: 13 MG/DL (ref 5–21)
BUN/CREAT SERPL: 21.3
CALCIUM SPEC-SCNC: 9.1 MG/DL (ref 8.4–10.4)
CHLORIDE SERPL-SCNC: 105 MMOL/L (ref 98–110)
CO2 SERPL-SCNC: 28 MMOL/L (ref 24–31)
CREAT SERPL-MCNC: 0.61 MG/DL (ref 0.5–1.4)
EGFRCR SERPLBLD CKD-EPI 2021: 128.2 ML/MIN/1.73
GLOBULIN UR ELPH-MCNC: 3.2 GM/DL
GLUCOSE SERPL-MCNC: 104 MG/DL (ref 70–100)
POTASSIUM SERPL-SCNC: 4.4 MMOL/L (ref 3.5–5.3)
PROT SERPL-MCNC: 7.5 G/DL (ref 6.3–8.7)
SODIUM SERPL-SCNC: 139 MMOL/L (ref 135–145)

## 2023-11-08 PROCEDURE — 36415 COLL VENOUS BLD VENIPUNCTURE: CPT

## 2023-11-08 PROCEDURE — 80053 COMPREHEN METABOLIC PANEL: CPT

## 2023-11-15 NOTE — PROGRESS NOTES
[facility] [place]    Patient:  Sharifa Yuan 23 y.o. female  YOB: 2000  MRN: [id number]  Primary Care Physician: Provider, No Known  REFERRING PROVIDER: Stephanie Bruce, *    Chief Complaint positve QuantiFeron Gold (No complaints)      History of Present Illness  Sharifa Yuan presents to [facility] INFECTIOUS DISEASES    Patient last saw SUE Pena  on before starting treatment  Next appointment is on Patient is unsure as to when she see her a gain    Patient is on last month for treatment with INH, rifampin and B6.  She has no symptoms of neuropathy.  She indicates she is taking the vitamin B6 daily.  She has no nausea vomiting or abdominal discomfort.    She has not had any significant flare of her skin disease so she has been pleased with that    Current Outpatient Medications on File Prior to Visit   Medication Sig    Cosentyx, 300 MG Dose, 150 MG/ML solution prefilled syringe Inject 1 mL under the skin into the appropriate area as directed Every 30 (Thirty) Days. ON HOLD. LAST DOSE WAS 2023.    isoniazid (NYDRAZID) 300 MG tablet TAKE 1 TABLET BY MOUTH EVERY DAY    rifAMPin (RIFADIN) 300 MG capsule TAKE 2 CAPSULES BY MOUTH EVERY DAY    vitamin B-6 (PYRIDOXINE) 50 MG tablet TAKE 1 TABLET BY MOUTH EVERY DAY     No current facility-administered medications on file prior to visit.     Allergies   Allergen Reactions    Amoxicillin-Pot Clavulanate Rash      Social History     Socioeconomic History    Marital status: Single   Tobacco Use    Smoking status: Former     Packs/day: 0.50     Years: 3.00     Additional pack years: 0.00     Total pack years: 1.50     Types: Cigarettes     Start date:      Quit date: 2019     Years since quittin.8     Passive exposure: Never    Smokeless tobacco: Never   Vaping Use    Vaping Use: Every day    Substances: Nicotine, Flavoring    Devices: Disposable, Pre-filled pod   Substance and Sexual Activity    Alcohol  "use: Not Currently    Drug use: Never    Sexual activity: Defer       Venous Access Review  Line/IV site: No current IV Access    Antimicrobial Review  Currently on antibiotics/antifungals: yes  Start Date of Therapy: Rifampin, INH, Vit B 6 9/11/2023  If therapy completed, date complete: 12/11/2023-3 months    Objective   Vital Signs:  There were no vitals taken for this visit.  Estimated body mass index is 22.15 kg/m² as calculated from the following:    Height as of 9/11/23: 160 cm (62.99\").    Weight as of 9/11/23: 56.7 kg (125 lb).           Physical Exam   DATA REVIEWED:  The following data was reviewed by: Shena Thomas MD on 11/17/2023:  Lab Results - Last 18 Months   Lab Units 11/08/23  0847   CREATININE mg/dL 0.61   ALT (SGPT) U/L 21   AST (SGOT) U/L 26   BUN / CREAT RATIO  21.3   POTASSIUM mmol/L 4.4         Comprehensive Metabolic Panel (11/08/2023 08:47)       XR Chest 2 View  9/8/2023         IMPRESSION:  1. No acute disease.    Chest x-ray reviewed-PA and lateral above.  Heart and mediastinum appear normal.  Lungs are clear.     Assessment and Plan   Diagnoses and all orders for this visit:    1. Positive QuantiFERON-TB Gold test (Primary)    2. Latent tuberculosis diagnosed by blood test  Comments:  Patient will complete 3 months of INH, rifampin, vitamin B6 in December, 2023          Patient Instructions   Continue INH, rifampin, vitamin B6 through December 11 to complete 3 months of treatment for latent tuberculosis  Patient does not need further skin testing or blood testing for TB as may continue to show positive despite treatment for latent tuberculosis  Patient will need evaluation if she develops any signs or symptoms of active tuberculosis such as fever, night sweats, unexplained weight loss, cough with or without hemoptysis.  Explained that at that time we would likely get chest x-ray and certainly evaluate for other possible infections as well as TB          Follow Up   No follow-ups " on file.  Patient was given instructions and counseling regarding her condition or for health maintenance advice. Please see specific information pulled into the AVS if appropriate.     Mode of Visit: Video  [place] of patient[place]  [place] [facility] [place]  You have chosen to receive care through a telehealth visit.  Does the patient consent to use a video/audio connection for your medical care today? Yes  The visit included audio and video interaction. No technical issues occurred during this visit.

## 2023-11-17 ENCOUNTER — TELEMEDICINE (OUTPATIENT)
Age: 23
End: 2023-11-17
Payer: MEDICAID

## 2023-11-17 DIAGNOSIS — R76.12 POSITIVE QUANTIFERON-TB GOLD TEST: Primary | ICD-10-CM

## 2023-11-17 DIAGNOSIS — Z22.7 LATENT TUBERCULOSIS DIAGNOSED BY BLOOD TEST: ICD-10-CM

## 2023-11-17 NOTE — PATIENT INSTRUCTIONS
Continue INH, rifampin, vitamin B6 through December 11 to complete 3 months of treatment for latent tuberculosis  Patient does not need further skin testing or blood testing for TB as may continue to show positive despite treatment for latent tuberculosis  Patient will need evaluation if she develops any signs or symptoms of active tuberculosis such as fever, night sweats, unexplained weight loss, cough with or without hemoptysis.  Explained that at that time we would likely get chest x-ray and certainly evaluate for other possible infections as well as TB

## 2023-12-11 RX ORDER — RIFAMPIN 300 MG/1
600 CAPSULE ORAL DAILY
Qty: 60 CAPSULE | Refills: 0 | OUTPATIENT
Start: 2023-12-11

## 2023-12-11 RX ORDER — LANOLIN ALCOHOL/MO/W.PET/CERES
50 CREAM (GRAM) TOPICAL DAILY
Qty: 30 TABLET | Refills: 0 | OUTPATIENT
Start: 2023-12-11

## 2023-12-11 NOTE — TELEPHONE ENCOUNTER
I called patient to confirmed she is finishing up month 3 of 3 of INH, Rifampin, vitamin B6, and she does not need refills.  I told her I will refuse this refill request sent by her pharmacy.